# Patient Record
(demographics unavailable — no encounter records)

---

## 2025-02-06 NOTE — PHYSICAL EXAM
[General Appearance - Alert] : alert [General Appearance - In No Acute Distress] : in no acute distress [General Appearance - Well Nourished] : well nourished [General Appearance - Well Developed] : well developed [General Appearance - Well-Appearing] : healthy appearing [Sclera] : the sclera and conjunctiva were normal [Examination Of The Oral Cavity] : the lips and gums were normal [Oropharynx] : the oropharynx was normal [Respiration, Rhythm And Depth] : normal respiratory rhythm and effort [Exaggerated Use Of Accessory Muscles For Inspiration] : no accessory muscle use [No Spinal Tenderness] : no spinal tenderness [] : no rash [Oriented To Time, Place, And Person] : oriented to person, place, and time [Affect] : the affect was normal [Mood] : the mood was normal [Auscultation Breath Sounds / Voice Sounds] : lungs were clear to auscultation bilaterally [Heart Rate And Rhythm] : heart rate was normal and rhythm regular [Heart Sounds] : normal S1 and S2 [Abnormal Walk] : normal gait [FreeTextEntry1] : Hyperpigmentation of the pinna bilaterally.  scattered hyperpigementation

## 2025-02-06 NOTE — ASSESSMENT
[FreeTextEntry1] : 83-year-old woman referred for rheumatology evaluation.  Patient with polyarthralgia for many years, reports previous steroid injection in the right shoulder with some benefit.  Recently evaluated by primary care doctor, noted to have strongly positive RENAE 1: 1280 and referred for rheumatology evaluation.  At this time, patient without active synovitis, possible PMR-like symptoms although does not report significant morning stiffness.  Patient does not meet criteria for an underlying connective tissue disease at this time, although does have history of alopecia, joint symptoms, in the setting of positive RENAE, will obtain further serologies today including double-stranded DNA, ZO ab, scleroderma antibodies, anti SSA and SSB, in addition to ESR, CRP, and CPK.  Patient with rheumatoid factor negative, will also check anti-CCP antibody today in office.  Patient was prescribed medication for the pain which has helped, but does not recall the name at this time, will bring medications to next visit. Will refer for physical therapy for bilateral shoulder pain and deconditioning, will consider further imaging pending blood test results.  Will follow-up in 2 weeks or sooner as needed.

## 2025-02-06 NOTE — REVIEW OF SYSTEMS
[Feeling Poorly] : feeling poorly [Arthralgias] : arthralgias [Joint Pain] : joint pain [Negative] : Heme/Lymph [Joint Swelling] : no joint swelling

## 2025-02-06 NOTE — HISTORY OF PRESENT ILLNESS
[FreeTextEntry1] : February 6, 2025 [place]  # [id number] Patient is here with her friend, who is also helping to translate at the request of patient Patient referred by primary care doctor for rheumatology evaluation Patient reports history of arthritis for many years Reports arthritis everywhere, hands, feet, shoulders At some point about 2-3 months ago, could not walk, hard to move  Hard to get into car, needed assistance +pruritus of the back  No history of psoriasis, no rashes Bilateral lower extremity edema PMD: Dr. Pool Negron [address] [place] [address] [phone]  About two weeks ago, patient was given pain medication but does not recall name, started this week Helped with the pain No physical therapy previously No recent imaging Had labs completed, will request from office for review  Meds: norvasc since 2007 No other medications  No history of fracture as adult Next week scheduled for DEXA  Came to the  1/15/2025 Had right shoulder steroid injection about 6 months ago with some benefit  1971 had surgery for gastric ulcer  No rashes No Raynaud's No swollen joints  Feels stiffness +alopecia No oral ulcers No photosensitivity No chest pain or SOB No sicca symptoms  Labs reviewed from primary care doctor January 23, 2025 CBC normal CMP normal except chloride 109, CO2 18, alk phos 148  AST 27, ALT total cholesterol 239  triglycerides 91 HDL 84   A1c 5.8 TSH 1.39 Rheumatoid factor <10 RENAE 1:1280  Unclear family history No known history of lupus previously, no previous history of rheumatoid arthritis  20

## 2025-02-28 NOTE — DATA REVIEWED
[FreeTextEntry1] : Labs reviewed from primary care doctor January 23, 2025 CBC normal CMP normal except chloride 109, CO2 18, alk phos 148  AST 27, ALT total cholesterol 239 triglycerides 91 HDL 84  A1c 5.8 TSH 1.39 Rheumatoid factor <10 RENAE 1:1280

## 2025-02-28 NOTE — ASSESSMENT
[FreeTextEntry1] : 83-year-old woman referred for rheumatology evaluation.  Patient with polyarthralgia for many years, reports previous steroid injection in the right shoulder with some benefit.  Recently evaluated by primary care doctor, noted to have strongly positive RENAE 1: 1280 and referred for rheumatology evaluation.  At this time, patient without active synovitis, possible PMR-like symptoms although does not report significant morning stiffness and inflammatory markers in the normal range.  Patient does not meet criteria for an underlying connective tissue disease at this time, although does have history of alopecia in addition to RENAE positive. further serologies obtained at previous visit, which were negative in addition to CPK in the normal range. Patient will start PT next week and imaging ordered by medical doctor, will forward results to office for my review in addition to DEXA results. Patient will be returning home next month but will return prior to leaving as needed.

## 2025-02-28 NOTE — HISTORY OF PRESENT ILLNESS
[FreeTextEntry1] : February 27, 2025 Patient returns for follow up, translated by friend Patient feeling well Reviewed labs, copy of results provided to patient Will have xrays, DEXA, and mammogram as well, with follow up with PMD, office information provided to send results to office for my review as well RENAE positive, serologies negative Will start PT next week as well Will be travelling back home next month, unclear if will be returning to [place]  February 6, 2025 [place]  # [id number] Patient is here with her friend, who is also helping to translate at the request of patient Patient referred by primary care doctor for rheumatology evaluation Patient reports history of arthritis for many years Reports arthritis everywhere, hands, feet, shoulders At some point about 2-3 months ago, could not walk, hard to move  Hard to get into car, needed assistance +pruritus of the back  No history of psoriasis, no rashes Bilateral lower extremity edema PMD: Dr. Pool Negron [address] [place] [address] [phone]  About two weeks ago, patient was given pain medication but does not recall name, started this week Helped with the pain No physical therapy previously No recent imaging Had labs completed, will request from office for review  Meds: norvasc since 2007 No other medications  No history of fracture as adult Next week scheduled for DEXA  Came to the [place] 1/15/2025 Had right shoulder steroid injection about 6 months ago with some benefit  1971 had surgery for gastric ulcer  No rashes No Raynaud's No swollen joints  Feels stiffness +alopecia No oral ulcers No photosensitivity No chest pain or SOB No sicca symptoms  Labs reviewed from primary care doctor January 23, 2025 CBC normal CMP normal except chloride 109, CO2 18, alk phos 148  AST 27, ALT total cholesterol 239  triglycerides 91 HDL 84   A1c 5.8 TSH 1.39 Rheumatoid factor <10 RENAE 1:1280  Unclear family history No known history of lupus previously, no previous history of rheumatoid arthritis

## 2025-02-28 NOTE — PHYSICAL EXAM
[General Appearance - Alert] : alert [General Appearance - In No Acute Distress] : in no acute distress [General Appearance - Well-Appearing] : healthy appearing [Sclera] : the sclera and conjunctiva were normal [] : no respiratory distress [Respiration, Rhythm And Depth] : normal respiratory rhythm and effort [Exaggerated Use Of Accessory Muscles For Inspiration] : no accessory muscle use [Abnormal Walk] : normal gait [Nail Clubbing] : no clubbing  or cyanosis of the fingernails [Affect] : the affect was normal [Mood] : the mood was normal [FreeTextEntry1] : right 3rd PIP enlargement without tenderness, +DIP and PIP changes.  No MCP tenderness or synovitis, no wrist tenderness, good ROM of the elbows, pain with ROM of the shoulders bilaterally, with decrease at extremes of rotation. Good ROM of the hips, knees, ankles. Good ROM of the cervical spine, no spinal tenderness